# Patient Record
Sex: MALE | Race: WHITE | NOT HISPANIC OR LATINO | Employment: FULL TIME | URBAN - METROPOLITAN AREA
[De-identification: names, ages, dates, MRNs, and addresses within clinical notes are randomized per-mention and may not be internally consistent; named-entity substitution may affect disease eponyms.]

---

## 2023-05-08 ENCOUNTER — OFFICE VISIT (OUTPATIENT)
Dept: INTERNAL MEDICINE | Facility: CLINIC | Age: 53
End: 2023-05-08
Payer: COMMERCIAL

## 2023-05-08 VITALS
HEART RATE: 76 BPM | SYSTOLIC BLOOD PRESSURE: 136 MMHG | BODY MASS INDEX: 29.68 KG/M2 | WEIGHT: 200.4 LBS | DIASTOLIC BLOOD PRESSURE: 96 MMHG | HEIGHT: 69 IN | TEMPERATURE: 98.2 F

## 2023-05-08 DIAGNOSIS — Z12.11 SCREEN FOR COLON CANCER: ICD-10-CM

## 2023-05-08 DIAGNOSIS — I10 ESSENTIAL HYPERTENSION: Primary | Chronic | ICD-10-CM

## 2023-05-08 DIAGNOSIS — Z11.59 NEED FOR HEPATITIS C SCREENING TEST: ICD-10-CM

## 2023-05-08 DIAGNOSIS — F51.01 PRIMARY INSOMNIA: Chronic | ICD-10-CM

## 2023-05-08 DIAGNOSIS — M47.812 SPONDYLOSIS OF CERVICAL REGION WITHOUT MYELOPATHY OR RADICULOPATHY: ICD-10-CM

## 2023-05-08 DIAGNOSIS — Z13.220 LIPID SCREENING: ICD-10-CM

## 2023-05-08 DIAGNOSIS — M16.11 PRIMARY OSTEOARTHRITIS OF RIGHT HIP: Chronic | ICD-10-CM

## 2023-05-08 DIAGNOSIS — E29.1 HYPOGONADISM IN MALE: Chronic | ICD-10-CM

## 2023-05-08 PROBLEM — E66.3 OVERWEIGHT (BMI 25.0-29.9): Status: ACTIVE | Noted: 2023-05-08

## 2023-05-08 PROBLEM — E66.3 OVERWEIGHT (BMI 25.0-29.9): Status: RESOLVED | Noted: 2023-05-08 | Resolved: 2023-05-08

## 2023-05-08 PROCEDURE — 99204 OFFICE O/P NEW MOD 45 MIN: CPT | Performed by: STUDENT IN AN ORGANIZED HEALTH CARE EDUCATION/TRAINING PROGRAM

## 2023-05-08 RX ORDER — NIFEDIPINE 30 MG/1
30 TABLET, EXTENDED RELEASE ORAL DAILY
Qty: 30 TABLET | Refills: 1 | Status: SHIPPED | OUTPATIENT
Start: 2023-05-08

## 2023-05-08 RX ORDER — TESTOSTERONE CYPIONATE 200 MG/ML
0.5 VIAL (ML) INTRAMUSCULAR WEEKLY
COMMUNITY

## 2023-05-08 RX ORDER — QUETIAPINE FUMARATE 100 MG/1
100 TABLET, FILM COATED ORAL NIGHTLY
COMMUNITY
Start: 2023-04-26

## 2023-05-08 NOTE — PROGRESS NOTES
Chief Complaint  Jonatan Sommers is a 52 y.o. male presenting for Insomnia (Establish Care ).     From [place]. Moved to [place] 2022. , has 2 children (they live in [place]). Lives with girlfriend (been together since 2016). Works as  at [email or web address].    Patient has a past medical history of hypogonadism (since 20s, on testosterone by [facility] online), hypertension (2023), cervical radiculopathy (R arm, s/p spinal fusion 2010), and insomnia.    History of Present Illness  Patient is here to [facility], he has not had primary care physician since moving from [place].    Patient is in overall good health.  He has a history of hypogonadism found in his 20s.  He tells me his level was in the 100s.  He is very athletic and muscular, never used any form of steroids in the past.  He estimates he has 2-3% body fat.  He currently follows with online clinic [facility] with visits every 3 months, and is currently on testosterone supplement for maintenance of normal levels.    Patient also has a history of insomnia.  He works as a  at [employer] and works until 3 AM, he then goes to the gym and works out until 6 AM.  He typically eats right after and goes to bed around 7 AM.  Usually he sleeps for about 4 hours.  He is on Seroquel for his sleep, he used to be on lower dose before, but it not sufficient.  He has also tried melatonin.    He has a history of degenerative changes of his cervical spine, which was complicated by right arm radiculopathy.  He underwent spinal fusion in 2010, anterior access.  Symptoms resolved.  He also has a history of right hip osteoarthritis, he follows with Ortho and is scheduled for hip replacement in July.    Patient does have a history of elevated blood pressures, but he was never on medications before typically his blood pressures are in the 140s/90s.  His father had A-fib and passed from Alzheimer's at age 73.  His mother is alive and healthy.    Of  "note he did have colonoscopy in the past, last in 2008.  Done for screening.  No family history of colorectal cancer, history of polyps.  Patient amenable to repeat colonoscopy for screening    \The following portions of the patient's history were reviewed and updated as appropriate: allergies, current medications, past family history, past medical history, past social history, past surgical history and problem list.    Objective  /96 (BP Location: Left arm, Patient Position: Sitting, Cuff Size: Large Adult)   Pulse 76   Temp 98.2 °F (36.8 °C) (Temporal)   Ht 175.3 cm (69\")   Wt 90.9 kg (200 lb 6.4 oz)   BMI 29.59 kg/m²     Physical Exam  Vitals reviewed.   Constitutional:       Appearance: Normal appearance.   HENT:      Head: Normocephalic and atraumatic.      Nose: No congestion.   Eyes:      Extraocular Movements: Extraocular movements intact.      Conjunctiva/sclera: Conjunctivae normal.   Cardiovascular:      Rate and Rhythm: Normal rate and regular rhythm.      Heart sounds: Normal heart sounds. No murmur heard.  Pulmonary:      Effort: Pulmonary effort is normal.      Breath sounds: Normal breath sounds.   Abdominal:      General: There is no distension.      Palpations: Abdomen is soft. There is no mass.      Tenderness: There is no abdominal tenderness.   Musculoskeletal:      Cervical back: Neck supple. No tenderness.      Right lower leg: No edema.      Left lower leg: No edema.   Lymphadenopathy:      Cervical: No cervical adenopathy.   Skin:     General: Skin is warm and dry.   Neurological:      Mental Status: He is alert and oriented to person, place, and time. Mental status is at baseline.      Gait: Gait abnormal.      Comments: Walking with limp on the right side   Psychiatric:         Behavior: Behavior normal.         Thought Content: Thought content normal.         Assessment/Plan   1. Essential hypertension  BP Readings from Last 3 Encounters:   05/08/23 136/96   Blood pressure " consistently elevated.  Recommend starting blood pressure medication.  He is amenable.  Discussed side effects on nifedipine including potential for lightheadedness/low blood pressure, can also cause swelling of the ankles.  Some patients may experience erectile dysfunction.  I have asked him to recheck his blood pressure at home, if it remains over 140/90 I would consider increasing the dose.  I recommended a follow-up in about 4 weeks, but the patient is very busy and does not have time until July.  We will do blood work before his visit in July if he is able to come in before his appointment.  - NIFEdipine XL (PROCARDIA XL) 30 MG 24 hr tablet; Take 1 tablet by mouth Daily.  Dispense: 30 tablet; Refill: 1  - Comprehensive Metabolic Panel; Future    2. Primary insomnia  Continue on Seroquel 100 mg.  Usually medications will not solve a chronic sleep issue.  He typically sleeps around 4 hours every night, is hard to fall asleep.  Ideally he would not exercise the last 3 hours before he goes to bed, also I would not ideally eat before going to bed, this will typically affect sleep.  For now patient wants to continue on Seroquel.    3. Hypogonadism in male  Continue follow-up with online clinic.  They also checked his CBC/hematocrit    4. Primary osteoarthritis of right hip  Continue follow-up with Ortho for hip replacement    5. Spondylosis of cervical region without myelopathy or radiculopathy  Resolved, no current symptoms    6. Screen for colon cancer  We will proceed with colonoscopy as  - Ambulatory Referral For Screening Colonoscopy    7. Lipid screening  Patient will return for fasting lipids before his next visit  - Lipid Panel; Future    8. Need for hepatitis C screening test  We will screen as recommended  - Hepatitis C Antibody; Future    9. BMI 29.0-29.9,adult  BMI is >= 25 and <30. (Overweight) The following options were offered after discussion;: This smart set does not take into account patient's body  habitus.  He is very muscular/athletic, he is not considered overweight.  He has a healthy weight based on my clinical evaluation    Offered COVID booster, shingles vaccine.  Patient declines at this time.  I have discussed the potential for getting severe rash related to shingles, that can cause lingering pain that is very difficult to manage.  I encouraged him to think about the shingles vaccine.    Return in about 10 weeks (around 7/17/2023) for Recheck.    Future Appointments       Provider Department Center    7/17/2023 12:45 PM Karlos Wiggins MD [facility] [place]          Karlos Wiggins MD  Family Medicine  05/08/2023

## 2023-05-18 ENCOUNTER — PATIENT ROUNDING (BHMG ONLY) (OUTPATIENT)
Dept: INTERNAL MEDICINE | Facility: CLINIC | Age: 53
End: 2023-05-18
Payer: COMMERCIAL

## 2023-09-18 RX ORDER — QUETIAPINE FUMARATE 100 MG/1
150 TABLET, FILM COATED ORAL NIGHTLY
Qty: 45 TABLET | Refills: 5 | Status: SHIPPED | OUTPATIENT
Start: 2023-09-18

## 2023-10-27 ENCOUNTER — TELEPHONE (OUTPATIENT)
Dept: INTERNAL MEDICINE | Facility: CLINIC | Age: 53
End: 2023-10-27
Payer: COMMERCIAL

## 2023-10-27 NOTE — TELEPHONE ENCOUNTER
Please reach out to patient to see if he plans to follow-up with me.  He canceled his follow-up appointment in July and also did not get the blood work collected.  He needs follow-up for his blood pressure.  Also I had ordered colonoscopy for him and it looks like he did not schedule.  There is the option of doing stool testing instead with [facility], which is good for 3 years.  Please check with patient.    Kralos Wiggins MD

## 2023-11-28 RX ORDER — TERBINAFINE HYDROCHLORIDE 250 MG/1
250 TABLET ORAL DAILY
Qty: 42 TABLET | Refills: 0 | Status: SHIPPED | OUTPATIENT
Start: 2023-11-28

## 2024-03-18 RX ORDER — QUETIAPINE FUMARATE 100 MG/1
TABLET, FILM COATED ORAL
Qty: 45 TABLET | Refills: 5 | OUTPATIENT
Start: 2024-03-18

## 2024-03-19 RX ORDER — QUETIAPINE FUMARATE 100 MG/1
150 TABLET, FILM COATED ORAL NIGHTLY
Qty: 45 TABLET | Refills: 11 | Status: SHIPPED | OUTPATIENT
Start: 2024-03-19

## 2024-03-19 RX ORDER — TERBINAFINE HYDROCHLORIDE 250 MG/1
250 TABLET ORAL DAILY
Qty: 42 TABLET | Refills: 1 | Status: SHIPPED | OUTPATIENT
Start: 2024-03-19

## 2024-05-01 RX ORDER — AMOXICILLIN 500 MG/1
1000 CAPSULE ORAL 2 TIMES DAILY
Qty: 56 CAPSULE | Refills: 0 | Status: SHIPPED | OUTPATIENT
Start: 2024-05-01 | End: 2024-05-15